# Patient Record
Sex: FEMALE | URBAN - METROPOLITAN AREA
[De-identification: names, ages, dates, MRNs, and addresses within clinical notes are randomized per-mention and may not be internally consistent; named-entity substitution may affect disease eponyms.]

---

## 2020-06-17 ENCOUNTER — NEW PATIENT (OUTPATIENT)
Dept: URBAN - METROPOLITAN AREA CLINIC 24 | Facility: CLINIC | Age: 65
End: 2020-06-17
Payer: COMMERCIAL

## 2020-06-17 DIAGNOSIS — Z83.511 FAMILY HISTORY OF GLAUCOMA: ICD-10-CM

## 2020-06-17 PROCEDURE — 92004 COMPRE OPH EXAM NEW PT 1/>: CPT | Performed by: OPHTHALMOLOGY

## 2020-06-17 ASSESSMENT — INTRAOCULAR PRESSURE
OS: 16
OD: 16

## 2020-06-17 ASSESSMENT — KERATOMETRY
OD: 42.77
OS: 42.70

## 2020-06-17 ASSESSMENT — VISUAL ACUITY
OS: 20/20
OD: 20/20

## 2021-10-06 ENCOUNTER — OFFICE VISIT (OUTPATIENT)
Dept: URBAN - METROPOLITAN AREA CLINIC 24 | Facility: CLINIC | Age: 66
End: 2021-10-06
Payer: COMMERCIAL

## 2021-10-06 DIAGNOSIS — H25.13 AGE-RELATED NUCLEAR CATARACT, BILATERAL: Primary | ICD-10-CM

## 2021-10-06 DIAGNOSIS — H04.121 TEAR FILM INSUFFICIENCY OF RIGHT LACRIMAL GLAND: ICD-10-CM

## 2021-10-06 DIAGNOSIS — H43.393 OTHER VITREOUS OPACITIES, BILATERAL: ICD-10-CM

## 2021-10-06 PROCEDURE — 92004 COMPRE OPH EXAM NEW PT 1/>: CPT | Performed by: OPTOMETRIST

## 2021-10-06 ASSESSMENT — KERATOMETRY
OD: 42.66
OS: 42.75

## 2021-10-06 ASSESSMENT — INTRAOCULAR PRESSURE
OS: 19
OD: 18

## 2021-10-06 ASSESSMENT — VISUAL ACUITY
OD: 20/20
OS: 20/20

## 2021-10-06 NOTE — IMPRESSION/PLAN
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 Plan: Recommend Omega 3 fatty acids (2-3,000 mg) daily, artificial tears at least 4-6 times a day and gel drop or tear ointment at bedtime.

## 2022-04-26 ENCOUNTER — OFFICE VISIT (OUTPATIENT)
Dept: URBAN - METROPOLITAN AREA CLINIC 24 | Facility: CLINIC | Age: 67
End: 2022-04-26
Payer: MEDICARE

## 2022-04-26 DIAGNOSIS — N30.10: ICD-10-CM

## 2022-04-26 DIAGNOSIS — H04.121 TEAR FILM INSUFFICIENCY OF RIGHT LACRIMAL GLAND: ICD-10-CM

## 2022-04-26 DIAGNOSIS — Z79.899 OTHER LONG TERM (CURRENT) DRUG THERAPY: Primary | ICD-10-CM

## 2022-04-26 PROCEDURE — 92250 FUNDUS PHOTOGRAPHY W/I&R: CPT | Performed by: OPHTHALMOLOGY

## 2022-04-26 PROCEDURE — 92014 COMPRE OPH EXAM EST PT 1/>: CPT | Performed by: OPHTHALMOLOGY

## 2022-04-26 PROCEDURE — 92134 CPTRZ OPH DX IMG PST SGM RTA: CPT | Performed by: OPHTHALMOLOGY

## 2022-04-26 ASSESSMENT — INTRAOCULAR PRESSURE
OD: 13
OS: 14

## 2022-04-26 NOTE — IMPRESSION/PLAN
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 Plan: Dry eye symptoms account for intermittent blurred vision.  Continue PFATs QID OU

## 2022-04-26 NOTE — IMPRESSION/PLAN
Impression: Other long term (current) drug therapy: Z79.899. Plan: Patient was previously on pentosan polysulfate - has now discontinued. 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  Observe closely and follow-up in 3 months, sooner PRN

## 2022-11-15 ENCOUNTER — OFFICE VISIT (OUTPATIENT)
Dept: URBAN - METROPOLITAN AREA CLINIC 23 | Facility: CLINIC | Age: 67
End: 2022-11-15
Payer: MEDICARE

## 2022-11-15 DIAGNOSIS — H04.123 DRY EYE SYNDROME OF BILATERAL LACRIMAL GLANDS: Primary | ICD-10-CM

## 2022-11-15 PROCEDURE — 99203 OFFICE O/P NEW LOW 30 MIN: CPT | Performed by: OPTOMETRIST

## 2022-11-15 ASSESSMENT — KERATOMETRY
OS: 43.00
OD: 42.88

## 2022-11-15 ASSESSMENT — INTRAOCULAR PRESSURE
OD: 16
OS: 16

## 2022-11-15 NOTE — IMPRESSION/PLAN
Impression: Dry eye syndrome of bilateral lacrimal glands: H04.123. Condition: well controlled. Plan: Discussed findings. Recommend continuing PF artificial tears throughout the day, gel at bedtime may also be helpful. Call if symptoms worsen.

## 2024-02-21 PROBLEM — I10 HYPERTENSIVE DISORDER: Status: RESOLVED | Noted: 2024-02-21 | Resolved: 2024-02-21

## 2024-02-21 PROBLEM — I10 HYPERTENSIVE DISORDER: Status: ACTIVE | Noted: 2024-02-21

## 2024-02-21 PROBLEM — Z85.828 HISTORY OF SKIN CANCER: Status: ACTIVE | Noted: 2024-02-21

## 2024-02-21 PROBLEM — N94.10 DYSPAREUNIA IN FEMALE: Status: RESOLVED | Noted: 2021-02-10 | Resolved: 2024-02-21

## 2024-02-21 PROBLEM — M16.12 OSTEOARTHRITIS OF LEFT HIP: Status: ACTIVE | Noted: 2023-01-11

## 2024-02-21 PROBLEM — R91.8 MULTIPLE NODULES OF LUNG: Status: ACTIVE | Noted: 2022-04-13

## 2024-02-21 PROBLEM — I73.9 PERIPHERAL VASCULAR DISEASE (HCC): Status: ACTIVE | Noted: 2023-04-12

## 2024-02-21 PROBLEM — M85.80 OSTEOPENIA WITH HIGH RISK OF FRACTURE: Status: ACTIVE | Noted: 2023-01-11

## 2024-02-21 PROBLEM — N95.8 GENITOURINARY SYNDROME OF MENOPAUSE: Status: ACTIVE | Noted: 2024-02-21

## 2024-02-21 PROBLEM — G60.9 IDIOPATHIC PERIPHERAL NEUROPATHY: Status: ACTIVE | Noted: 2020-09-29

## 2024-02-21 PROBLEM — G43.909 MIGRAINE: Status: ACTIVE | Noted: 2024-02-21

## 2024-02-21 PROBLEM — H26.9 CATARACT OF BOTH EYES: Status: ACTIVE | Noted: 2024-02-21

## 2024-02-21 PROBLEM — I10 ESSENTIAL HYPERTENSION: Status: ACTIVE | Noted: 2024-02-21
